# Patient Record
Sex: FEMALE | Race: WHITE
[De-identification: names, ages, dates, MRNs, and addresses within clinical notes are randomized per-mention and may not be internally consistent; named-entity substitution may affect disease eponyms.]

---

## 2018-05-10 ENCOUNTER — HOSPITAL ENCOUNTER (OUTPATIENT)
Dept: HOSPITAL 89 - ZZSENDIN | Age: 38
End: 2018-05-10
Attending: FAMILY MEDICINE
Payer: COMMERCIAL

## 2018-05-10 DIAGNOSIS — D22.9: Primary | ICD-10-CM

## 2018-05-10 PROCEDURE — 88305 TISSUE EXAM BY PATHOLOGIST: CPT

## 2019-07-01 ENCOUNTER — HOSPITAL ENCOUNTER (OUTPATIENT)
Dept: HOSPITAL 89 - MAMO | Age: 39
End: 2019-07-01
Attending: OBSTETRICS & GYNECOLOGY
Payer: COMMERCIAL

## 2019-07-01 DIAGNOSIS — R92.8: Primary | ICD-10-CM

## 2019-07-01 PROCEDURE — 77063 BREAST TOMOSYNTHESIS BI: CPT

## 2019-07-01 PROCEDURE — 77067 SCR MAMMO BI INCL CAD: CPT

## 2019-07-02 NOTE — RADIOLOGY IMAGING REPORT
FACILITY: [facility] 

 

PATIENT NAME: ROSEANNA SLADE

: [id number]

MR: [id number]

V: [id number]

EXAM DATE: 

ORDERING PHYSICIAN: MADHAVI CARTER

TECHNOLOGIST: Salena Álvarez

 

PROCEDURE: BILATERAL DIGITAL SCREENING MAMMOGRAM WITH CAD ASSISTED 

INTERPRETATION & 3D TOMOSYNTHESIS.

 

REASON FOR STUDY: Screening. 

 

FAMILY HISTORY OF BREAST CANCER: Possibly maternal grandmother. 

  

FAMILY HISTORY OF OVARIAN CANCER: Maternal grandmother.

 

BREAST PROCEDURES/TREATMENTS: None. 

 

COMPARISON: None. 

 

VIEWS OBTAINED:  Bilateral 2D & 3D full field CC & MLO projections 

& bilateral 2D full field XCC projections.

 

BREAST DENSITY: The breasts are heterogeneously dense which can obscure 

small masses. 

 

MAMMOGRAM FINDINGS: Medial to midline on the Left CC view in the middle 

depth there is suggestion of a nodular density for which Spot 

compression view and possibly Left breast Ultrasound is recommended. 

This is not ideally seen on the Left MLO view.

 

IMPRESSION: 

BIRADS 0: Incomplete.

   Additional views of the Left breast and possibly Left breast 

Ultrasound recommended for further evaluation.

 

DIAGNOSTIC CATEGORY 0--INCOMPLETE: NEED ADDITIONAL IMAGING EVALUATION.  

 

 

 

RECOMMENDATIONS:

ADDITIONAL MAMMOGRAPHIC VIEWS REQUIRED: LEFT BREAST.    

 

ULTRASOUND: LEFT BREAST.    

 

Dictated by:  Keyla Casas M.D. on 2019 at 16:26   

Transcribed by: FIX on 2019 at 7:49    

Approved by:  Keyla Casas M.D. on 2019 at 8:34   

[facility]

## 2019-07-26 ENCOUNTER — HOSPITAL ENCOUNTER (OUTPATIENT)
Dept: HOSPITAL 89 - ZZSENDIN | Age: 39
End: 2019-07-26
Attending: FAMILY MEDICINE
Payer: COMMERCIAL

## 2019-07-26 DIAGNOSIS — B96.20: ICD-10-CM

## 2019-07-26 DIAGNOSIS — R35.0: Primary | ICD-10-CM

## 2019-07-26 PROCEDURE — 87186 SC STD MICRODIL/AGAR DIL: CPT

## 2019-07-26 PROCEDURE — 87077 CULTURE AEROBIC IDENTIFY: CPT

## 2019-07-26 PROCEDURE — 87088 URINE BACTERIA CULTURE: CPT

## 2019-08-05 ENCOUNTER — HOSPITAL ENCOUNTER (OUTPATIENT)
Dept: HOSPITAL 89 - MAMO | Age: 39
End: 2019-08-05
Attending: OBSTETRICS & GYNECOLOGY
Payer: COMMERCIAL

## 2019-08-05 DIAGNOSIS — N60.02: ICD-10-CM

## 2019-08-05 DIAGNOSIS — R92.8: Primary | ICD-10-CM

## 2019-08-05 PROCEDURE — 77061 BREAST TOMOSYNTHESIS UNI: CPT

## 2019-08-05 PROCEDURE — 77065 DX MAMMO INCL CAD UNI: CPT

## 2019-08-06 NOTE — RADIOLOGY IMAGING REPORT
FACILITY: [facility] 

 

PATIENT NAME: ROSEANNA SLADE

: [id number]

MR: [id number]

V: [id number]

EXAM DATE: [id number]

ORDERING PHYSICIAN: MADHAVI CARTER

TECHNOLOGIST: Salena Álvarez

 

PROCEDURE:LEFT DIGITAL MAMMOGRAM DIAGNOSTIC WITH CAD ASSISTED 

INTERPRETATION & 3D TOMOSYNTHESIS.

 

REASON FOR STUDY:  Further evaluation.

 

COMPARISON STUDIES:  2019 & Left breast Ultrasound 2019.

 

 

MAMMOGRAM

 

VIEWS OBTAINED:  2D & 3D full field Left mediolateral view & 2D & 

3D Spot compression views in the Left CC & MLO projections.

 

BREAST DENSITY:  The breasts are heterogeneously dense which can 

obscure small masses.

 

MAMMOGRAM FINDINGS: Additional images confirm a circumscribed ovoid 

nodular density in the medial inferior portion of the Left breast.

 

 

ULTRASOUND

 

AREA SCANNED: 7-9 o'clock position of the Left breast.

 

ULTRASOUND FINDINGS: In the 7 o'clock position of the Left breast 4cm 

from the nipple there is a ovoid cyst measuring 8 x 3 x 8.5mm contains 

a small internal septation. This likely accounts for today's 

mammographic findings. 

 

DIAGNOSTIC CATEGORY 2--BENIGN FINDING.  

 

RECOMMENDATIONS:

ROUTINE MAMMOGRAM AND CLINICAL EVALUATION.   

 

 

 

IMPRESSION:

BIRADS 2: Benign finding.  

     There is a small cyst in the 7 o'clock position of the Left breast 

as described above.

 

 

 

Dictated by:  Keyla Casas M.D. on 2019 at 16:25   

Transcribed by: FIX on 2019 at 8:32    

Approved by:  Keyla Casas M.D. on 2019 at 14:32   

[facility]

## 2019-08-06 NOTE — RADIOLOGY IMAGING REPORT
FACILITY: [facility] 

 

PATIENT NAME: ROSEANNA SLADE

: [id number]

MR: [id number]

V: [id number]

EXAM DATE: [id number]

ORDERING PHYSICIAN: MADHAVI CARTER

TECHNOLOGIST: Cole Hou RDMS, VERA

 

PROCEDURE:US LEFT BREAST

 

COMPARISON:2019 mammogram.

 

INDICATIONS:Further evaluation

 

 

AREA SCANNED: 7-9 o'clock position of the Left breast.

 

ULTRASOUND FINDINGS: In the 7 o'clock position of the Left breast 4cm 

from the nipple there is a ovoid cyst measuring 8 x 3 x 8.5mm contains 

a small internal septation. This likely accounts for today's 

mammographic findings. 

 

DIAGNOSTIC CATEGORY 2--BENIGN FINDING.  

 

RECOMMENDATIONS:

ROUTINE MAMMOGRAM AND CLINICAL EVALUATION.   

 

 

 

IMPRESSION:

BIRADS 2: Benign finding.  

     There is a small cyst in the 7 o'clock position of the Left breast 

as described above.

 

 

Dictated by: Keyla Casas M.D. on 2019 at 16:25   

Transcribed by: FIX on 2019 at 10:10    

Approved by:  Keyla Casas M.D. on 2019 at 14:32   

[facility]